# Patient Record
Sex: FEMALE | Race: WHITE | NOT HISPANIC OR LATINO | URBAN - METROPOLITAN AREA
[De-identification: names, ages, dates, MRNs, and addresses within clinical notes are randomized per-mention and may not be internally consistent; named-entity substitution may affect disease eponyms.]

---

## 2022-02-19 ENCOUNTER — EMERGENCY (EMERGENCY)
Facility: HOSPITAL | Age: 87
LOS: 1 days | Discharge: HOME | End: 2022-02-19
Attending: STUDENT IN AN ORGANIZED HEALTH CARE EDUCATION/TRAINING PROGRAM
Payer: MEDICARE

## 2022-02-19 VITALS
OXYGEN SATURATION: 98 % | DIASTOLIC BLOOD PRESSURE: 62 MMHG | HEART RATE: 75 BPM | SYSTOLIC BLOOD PRESSURE: 121 MMHG | RESPIRATION RATE: 16 BRPM

## 2022-02-19 VITALS
OXYGEN SATURATION: 92 % | SYSTOLIC BLOOD PRESSURE: 124 MMHG | RESPIRATION RATE: 18 BRPM | DIASTOLIC BLOOD PRESSURE: 60 MMHG | HEART RATE: 56 BPM

## 2022-02-19 LAB
ALBUMIN SERPL ELPH-MCNC: 4 G/DL — SIGNIFICANT CHANGE UP (ref 3.3–5)
ALP SERPL-CCNC: 71 U/L — SIGNIFICANT CHANGE UP (ref 40–120)
ALT FLD-CCNC: 14 U/L — SIGNIFICANT CHANGE UP (ref 10–45)
ANION GAP SERPL CALC-SCNC: 15 MMOL/L — SIGNIFICANT CHANGE UP (ref 5–17)
APPEARANCE UR: ABNORMAL
APTT BLD: 28.9 SEC — SIGNIFICANT CHANGE UP (ref 27.5–35.5)
AST SERPL-CCNC: 21 U/L — SIGNIFICANT CHANGE UP (ref 10–40)
BACTERIA # UR AUTO: ABNORMAL
BASE EXCESS BLDV CALC-SCNC: -0.8 MMOL/L — SIGNIFICANT CHANGE UP (ref -2–2)
BASOPHILS # BLD AUTO: 0.04 K/UL — SIGNIFICANT CHANGE UP (ref 0–0.2)
BASOPHILS NFR BLD AUTO: 0.4 % — SIGNIFICANT CHANGE UP (ref 0–2)
BILIRUB SERPL-MCNC: 0.4 MG/DL — SIGNIFICANT CHANGE UP (ref 0.2–1.2)
BILIRUB UR-MCNC: NEGATIVE — SIGNIFICANT CHANGE UP
BUN SERPL-MCNC: 42 MG/DL — HIGH (ref 7–23)
CA-I SERPL-SCNC: 1.23 MMOL/L — SIGNIFICANT CHANGE UP (ref 1.15–1.33)
CALCIUM SERPL-MCNC: 9.5 MG/DL — SIGNIFICANT CHANGE UP (ref 8.4–10.5)
CHLORIDE BLDV-SCNC: 103 MMOL/L — SIGNIFICANT CHANGE UP (ref 96–108)
CHLORIDE SERPL-SCNC: 102 MMOL/L — SIGNIFICANT CHANGE UP (ref 96–108)
CK SERPL-CCNC: 443 U/L — HIGH (ref 25–170)
CO2 BLDV-SCNC: 26 MMOL/L — SIGNIFICANT CHANGE UP (ref 22–26)
CO2 SERPL-SCNC: 22 MMOL/L — SIGNIFICANT CHANGE UP (ref 22–31)
COLOR SPEC: YELLOW — SIGNIFICANT CHANGE UP
CREAT SERPL-MCNC: 1.21 MG/DL — SIGNIFICANT CHANGE UP (ref 0.5–1.3)
DIFF PNL FLD: NEGATIVE — SIGNIFICANT CHANGE UP
EOSINOPHIL # BLD AUTO: 0.28 K/UL — SIGNIFICANT CHANGE UP (ref 0–0.5)
EOSINOPHIL NFR BLD AUTO: 2.9 % — SIGNIFICANT CHANGE UP (ref 0–6)
EPI CELLS # UR: 1 /HPF — SIGNIFICANT CHANGE UP
GAS PNL BLDV: 136 MMOL/L — SIGNIFICANT CHANGE UP (ref 136–145)
GAS PNL BLDV: SIGNIFICANT CHANGE UP
GAS PNL BLDV: SIGNIFICANT CHANGE UP
GLUCOSE BLDV-MCNC: 89 MG/DL — SIGNIFICANT CHANGE UP (ref 70–99)
GLUCOSE SERPL-MCNC: 97 MG/DL — SIGNIFICANT CHANGE UP (ref 70–99)
GLUCOSE UR QL: NEGATIVE — SIGNIFICANT CHANGE UP
HCO3 BLDV-SCNC: 24 MMOL/L — SIGNIFICANT CHANGE UP (ref 22–29)
HCT VFR BLD CALC: 31.2 % — LOW (ref 34.5–45)
HCT VFR BLDA CALC: 32 % — LOW (ref 34.5–46.5)
HGB BLD CALC-MCNC: 10.7 G/DL — LOW (ref 11.7–16.1)
HGB BLD-MCNC: 10.2 G/DL — LOW (ref 11.5–15.5)
HYALINE CASTS # UR AUTO: 4 /LPF — HIGH (ref 0–2)
IMM GRANULOCYTES NFR BLD AUTO: 1.2 % — SIGNIFICANT CHANGE UP (ref 0–1.5)
INR BLD: 1.09 RATIO — SIGNIFICANT CHANGE UP (ref 0.88–1.16)
KETONES UR-MCNC: NEGATIVE — SIGNIFICANT CHANGE UP
LACTATE BLDV-MCNC: 1.9 MMOL/L — SIGNIFICANT CHANGE UP (ref 0.7–2)
LEUKOCYTE ESTERASE UR-ACNC: ABNORMAL
LYMPHOCYTES # BLD AUTO: 1.84 K/UL — SIGNIFICANT CHANGE UP (ref 1–3.3)
LYMPHOCYTES # BLD AUTO: 19.3 % — SIGNIFICANT CHANGE UP (ref 13–44)
MCHC RBC-ENTMCNC: 30.3 PG — SIGNIFICANT CHANGE UP (ref 27–34)
MCHC RBC-ENTMCNC: 32.7 GM/DL — SIGNIFICANT CHANGE UP (ref 32–36)
MCV RBC AUTO: 92.6 FL — SIGNIFICANT CHANGE UP (ref 80–100)
MONOCYTES # BLD AUTO: 0.81 K/UL — SIGNIFICANT CHANGE UP (ref 0–0.9)
MONOCYTES NFR BLD AUTO: 8.5 % — SIGNIFICANT CHANGE UP (ref 2–14)
NEUTROPHILS # BLD AUTO: 6.47 K/UL — SIGNIFICANT CHANGE UP (ref 1.8–7.4)
NEUTROPHILS NFR BLD AUTO: 67.7 % — SIGNIFICANT CHANGE UP (ref 43–77)
NITRITE UR-MCNC: NEGATIVE — SIGNIFICANT CHANGE UP
NRBC # BLD: 0 /100 WBCS — SIGNIFICANT CHANGE UP (ref 0–0)
PCO2 BLDV: 41 MMHG — SIGNIFICANT CHANGE UP (ref 39–42)
PH BLDV: 7.38 — SIGNIFICANT CHANGE UP (ref 7.32–7.43)
PH UR: 5.5 — SIGNIFICANT CHANGE UP (ref 5–8)
PLATELET # BLD AUTO: 206 K/UL — SIGNIFICANT CHANGE UP (ref 150–400)
PO2 BLDV: 44 MMHG — SIGNIFICANT CHANGE UP (ref 25–45)
POTASSIUM BLDV-SCNC: 4.1 MMOL/L — SIGNIFICANT CHANGE UP (ref 3.5–5.1)
POTASSIUM SERPL-MCNC: 4.4 MMOL/L — SIGNIFICANT CHANGE UP (ref 3.5–5.3)
POTASSIUM SERPL-SCNC: 4.4 MMOL/L — SIGNIFICANT CHANGE UP (ref 3.5–5.3)
PROT SERPL-MCNC: 6.8 G/DL — SIGNIFICANT CHANGE UP (ref 6–8.3)
PROT UR-MCNC: SIGNIFICANT CHANGE UP
PROTHROM AB SERPL-ACNC: 13 SEC — SIGNIFICANT CHANGE UP (ref 10.6–13.6)
RBC # BLD: 3.37 M/UL — LOW (ref 3.8–5.2)
RBC # FLD: 15.7 % — HIGH (ref 10.3–14.5)
RBC CASTS # UR COMP ASSIST: 2 /HPF — SIGNIFICANT CHANGE UP (ref 0–4)
SAO2 % BLDV: 75.4 % — SIGNIFICANT CHANGE UP (ref 67–88)
SARS-COV-2 RNA SPEC QL NAA+PROBE: SIGNIFICANT CHANGE UP
SODIUM SERPL-SCNC: 139 MMOL/L — SIGNIFICANT CHANGE UP (ref 135–145)
SP GR SPEC: 1.02 — SIGNIFICANT CHANGE UP (ref 1.01–1.02)
UROBILINOGEN FLD QL: NEGATIVE — SIGNIFICANT CHANGE UP
WBC # BLD: 9.55 K/UL — SIGNIFICANT CHANGE UP (ref 3.8–10.5)
WBC # FLD AUTO: 9.55 K/UL — SIGNIFICANT CHANGE UP (ref 3.8–10.5)
WBC UR QL: 8 /HPF — HIGH (ref 0–5)

## 2022-02-19 PROCEDURE — 93010 ELECTROCARDIOGRAM REPORT: CPT

## 2022-02-19 PROCEDURE — 71045 X-RAY EXAM CHEST 1 VIEW: CPT

## 2022-02-19 PROCEDURE — 82550 ASSAY OF CK (CPK): CPT

## 2022-02-19 PROCEDURE — 84295 ASSAY OF SERUM SODIUM: CPT

## 2022-02-19 PROCEDURE — 72170 X-RAY EXAM OF PELVIS: CPT | Mod: 26

## 2022-02-19 PROCEDURE — 82435 ASSAY OF BLOOD CHLORIDE: CPT

## 2022-02-19 PROCEDURE — 85730 THROMBOPLASTIN TIME PARTIAL: CPT

## 2022-02-19 PROCEDURE — 73070 X-RAY EXAM OF ELBOW: CPT | Mod: 26,LT

## 2022-02-19 PROCEDURE — 73090 X-RAY EXAM OF FOREARM: CPT

## 2022-02-19 PROCEDURE — 82803 BLOOD GASES ANY COMBINATION: CPT

## 2022-02-19 PROCEDURE — 83605 ASSAY OF LACTIC ACID: CPT

## 2022-02-19 PROCEDURE — 96374 THER/PROPH/DIAG INJ IV PUSH: CPT

## 2022-02-19 PROCEDURE — 84132 ASSAY OF SERUM POTASSIUM: CPT

## 2022-02-19 PROCEDURE — 73070 X-RAY EXAM OF ELBOW: CPT

## 2022-02-19 PROCEDURE — 71045 X-RAY EXAM CHEST 1 VIEW: CPT | Mod: 26

## 2022-02-19 PROCEDURE — 87635 SARS-COV-2 COVID-19 AMP PRB: CPT

## 2022-02-19 PROCEDURE — 90471 IMMUNIZATION ADMIN: CPT

## 2022-02-19 PROCEDURE — 81001 URINALYSIS AUTO W/SCOPE: CPT

## 2022-02-19 PROCEDURE — 85018 HEMOGLOBIN: CPT

## 2022-02-19 PROCEDURE — 85025 COMPLETE CBC W/AUTO DIFF WBC: CPT

## 2022-02-19 PROCEDURE — 90715 TDAP VACCINE 7 YRS/> IM: CPT

## 2022-02-19 PROCEDURE — 82565 ASSAY OF CREATININE: CPT

## 2022-02-19 PROCEDURE — 99284 EMERGENCY DEPT VISIT MOD MDM: CPT

## 2022-02-19 PROCEDURE — 80053 COMPREHEN METABOLIC PANEL: CPT

## 2022-02-19 PROCEDURE — 70450 CT HEAD/BRAIN W/O DYE: CPT | Mod: 26,MB

## 2022-02-19 PROCEDURE — 82330 ASSAY OF CALCIUM: CPT

## 2022-02-19 PROCEDURE — 70450 CT HEAD/BRAIN W/O DYE: CPT | Mod: MB

## 2022-02-19 PROCEDURE — 72125 CT NECK SPINE W/O DYE: CPT | Mod: MB

## 2022-02-19 PROCEDURE — 93005 ELECTROCARDIOGRAM TRACING: CPT

## 2022-02-19 PROCEDURE — 85014 HEMATOCRIT: CPT

## 2022-02-19 PROCEDURE — 72170 X-RAY EXAM OF PELVIS: CPT

## 2022-02-19 PROCEDURE — 99285 EMERGENCY DEPT VISIT HI MDM: CPT | Mod: 25

## 2022-02-19 PROCEDURE — 82947 ASSAY GLUCOSE BLOOD QUANT: CPT

## 2022-02-19 PROCEDURE — 73090 X-RAY EXAM OF FOREARM: CPT | Mod: 26,LT

## 2022-02-19 PROCEDURE — 36415 COLL VENOUS BLD VENIPUNCTURE: CPT

## 2022-02-19 PROCEDURE — 72125 CT NECK SPINE W/O DYE: CPT | Mod: 26,MB

## 2022-02-19 PROCEDURE — 87086 URINE CULTURE/COLONY COUNT: CPT

## 2022-02-19 PROCEDURE — 85610 PROTHROMBIN TIME: CPT

## 2022-02-19 RX ORDER — ACETAMINOPHEN 500 MG
975 TABLET ORAL ONCE
Refills: 0 | Status: DISCONTINUED | OUTPATIENT
Start: 2022-02-19 | End: 2022-02-19

## 2022-02-19 RX ORDER — ACETAMINOPHEN 500 MG
1000 TABLET ORAL ONCE
Refills: 0 | Status: COMPLETED | OUTPATIENT
Start: 2022-02-19 | End: 2022-02-19

## 2022-02-19 RX ORDER — TETANUS TOXOID, REDUCED DIPHTHERIA TOXOID AND ACELLULAR PERTUSSIS VACCINE, ADSORBED 5; 2.5; 8; 8; 2.5 [IU]/.5ML; [IU]/.5ML; UG/.5ML; UG/.5ML; UG/.5ML
0.5 SUSPENSION INTRAMUSCULAR ONCE
Refills: 0 | Status: COMPLETED | OUTPATIENT
Start: 2022-02-19 | End: 2022-02-19

## 2022-02-19 RX ADMIN — TETANUS TOXOID, REDUCED DIPHTHERIA TOXOID AND ACELLULAR PERTUSSIS VACCINE, ADSORBED 0.5 MILLILITER(S): 5; 2.5; 8; 8; 2.5 SUSPENSION INTRAMUSCULAR at 02:26

## 2022-02-19 RX ADMIN — Medication 400 MILLIGRAM(S): at 02:27

## 2022-02-19 NOTE — ED PROVIDER NOTE - CLINICAL SUMMARY MEDICAL DECISION MAKING FREE TEXT BOX
89 y/o female with pmhx of RA, HTN, macular degeneration, diffuse contractures, and Alzheimer's (Non-verbal at baseline) presenting from NH for unwitnessed fall with large hematoma on left forehead, no midline spinal TTP, no pelvic laxity, not on AC, unsure if UTD with tetanus. Trauma w/u including CT head/c-spine, CXR, pelvix xray, xray of LUE and basic labs including CK with UA

## 2022-02-19 NOTE — ED PROVIDER NOTE - NSFOLLOWUPINSTRUCTIONS_ED_ALL_ED_FT
- if pt develops any symptoms in 10d - 3 wks, given her age and the mechanism of injury, pt requires another CT head scan to evaluate for subdural hemorrhage or other late onset head injury. Please give her appropriate PCP follow up.    Closed Head Injury    A closed head injury is an injury to your head that may or may not involve a traumatic brain injury (TBI). Symptoms of TBI can be short or long lasting and include headache, dizziness, interference with memory or speech, fatigue, confusion, changes in sleep, mood changes, nausea, depression/anxiety, and dulling of senses. Make sure to obtain proper rest which includes getting plenty of sleep, avoiding excessive visual stimulation, and avoiding activities that may cause physical or mental stress. Avoid any situation where there is potential for another head injury, including sports.    SEEK IMMEDIATE MEDICAL CARE IF YOU HAVE ANY OF THE FOLLOWING SYMPTOMS: unusual drowsiness, vomiting, severe dizziness, seizures, lightheadedness, muscular weakness, different pupil sizes, visual changes, or clear or bloody discharge from your ears or nose. Xray of your pelvis showed a questionable luceny in your right hip hardware but given that you are not tender in this area it is unlikely that it represents a fracture. If you develop hip pain please return to ER.     - if pt develops any symptoms in 10d - 3 wks, given her age and the mechanism of injury, pt requires another CT head scan to evaluate for subdural hemorrhage or other late onset head injury. Please give her appropriate PCP follow up.    Closed Head Injury    A closed head injury is an injury to your head that may or may not involve a traumatic brain injury (TBI). Symptoms of TBI can be short or long lasting and include headache, dizziness, interference with memory or speech, fatigue, confusion, changes in sleep, mood changes, nausea, depression/anxiety, and dulling of senses. Make sure to obtain proper rest which includes getting plenty of sleep, avoiding excessive visual stimulation, and avoiding activities that may cause physical or mental stress. Avoid any situation where there is potential for another head injury, including sports.    SEEK IMMEDIATE MEDICAL CARE IF YOU HAVE ANY OF THE FOLLOWING SYMPTOMS: unusual drowsiness, vomiting, severe dizziness, seizures, lightheadedness, muscular weakness, different pupil sizes, visual changes, or clear or bloody discharge from your ears or nose.

## 2022-02-19 NOTE — ED ADULT NURSE NOTE - NSICDXPASTSURGICALHX_GEN_ALL_CORE_FT
PAST SURGICAL HISTORY:  Dementia mild    History of hip fracture s/p hip surgery    S/P laparoscopic cholecystectomy

## 2022-02-19 NOTE — ED ADULT NURSE REASSESSMENT NOTE - NS ED NURSE REASSESS COMMENT FT1
pt resting comfortably, no complaints at this time, pending nonemergent pt resting comfortably, no complaints at this time, pending nonemergent, nonemergent confirmed with Antione at the red desk

## 2022-02-19 NOTE — ED PROVIDER NOTE - ATTENDING CONTRIBUTION TO CARE
I have personally seen and examined this patient.  I have fully participated in the care of this patient. I performed a substantive portion of the visit including all aspects of the medical decision making. I have reviewed all pertinent clinical information, including history, physical exam, plan and the Resident’s note and agree except as noted. - MD Mary.    89 yo F, from NH after fall, unwitnessed, ++ bruise and hematoma on the left frontal, moving all extremities, no pain or limited ROM, reportedly no AC or ASA on her meds list, likely dc after labs, ua, but given her age and hematoma on the frontal, pt is at risk for devloping SDH in the later period, will communicate with NH for follow up plan.

## 2022-02-19 NOTE — ED PROVIDER NOTE - PATIENT PORTAL LINK FT
You can access the FollowMyHealth Patient Portal offered by [facility] by registering at the following website: [email or web address]. By joining [employer]’s FollowMyHealth portal, you will also be able to view your health information using other applications (apps) compatible with our system.

## 2022-02-19 NOTE — ED ADULT NURSE NOTE - OBJECTIVE STATEMENT
89 y.o. female coming in from a memory floor at [facility] via EMS for a fall. EMS states that the pt was found on the ground at 2300 after an unwitnessed fall. EMS states that the nurse was doing her rounds and found her down with a large baseball sized hematoma to her left forehead with minimal bleeding from the site. pt is nonverbal but Monegasque speaking, does not follow verbal commands at baseline. PMH of dementia, HTN, arthritis. A&Ox3, vitals stable, lung sounds clear bilaterally, no visible deformities other than hematoma on left forehead, bruise noted to left forearm/elbow but as per EMS it is unknown if it was there prior to the fall, neuro exam (PERRL 3, HARDIN to stimuli, unable to follow commands due to severity of illness), pulses present throughout lower and upper extremities, no active bleeding from hematoma, skin is dry and intact, a quarter sized blanchable stage 1 on the left buttock area.

## 2022-02-19 NOTE — ED PROVIDER NOTE - OBJECTIVE STATEMENT
89 y/o female with pmhx of RA, HTN, macular degeneration, diffuse contractures, and Alzheimer's (Non-verbal at baseline) presenting from NH for unwitnessed fall with large hematoma on left forehead. Nurse at NH reports that she found patient on side of her bed with large bump on her head. Patient unab 89 y/o female with pmhx of RA, HTN, macular degeneration, diffuse contractures, and Alzheimer's (Non-verbal at baseline) presenting from NH for unwitnessed fall with large hematoma on left forehead. Nurse at NH reports that she found patient on side of her bed with large bump on her head awake. Patient unable to provide hx due to dementia/non-verbal at baseline. No known AC.

## 2022-02-19 NOTE — ED PROVIDER NOTE - PHYSICAL EXAMINATION
Gen: WDWN, NAD, diffusely contracted, non-verbal   HEENT: Large hematoma to left forehead with no overlying laceration, no holman sign, PERRLA, EOMI, no nasal discharge, mucous membranes moist, no oropharyngeal edema/erythema/exudates   CV: RRR, +S1/S2, no M/R/G, equal b/l radial pulses 2+, no chest wall TTP/bruising/crepitus   Resp: CTAB, no W/R/R, SPO2 >95% on RA, no increased WOB   GI: Abdomen soft non-distended, NTTP, no masses/organomegaly   MSK/Skin: No midline spinal TTP or step offs, no CVA tenderness. LUE bruising to lateral forearm/elbow. No pelvic laxity   Neuro: A&Ox0, moving all 4 extremities spontaneously

## 2022-02-19 NOTE — ED PROVIDER NOTE - PROGRESS NOTE DETAILS
Tito, PGY-2  CT with no acute abnormalities. X-ray pelvis with questionable luceny in right hip hardware that could represent hardware fracture; recommend to correlate clinically with point tenderness. No TTP on exam. Will d/c with return precautions Attending/MD Mary. ua with trace infectious sings in a setting of positive hyalin cast and small epihelial cell, concerning for contamination, pending ucx, follow up the culture result, dc to NH.

## 2022-02-19 NOTE — ED ADULT NURSE NOTE - NSICDXPASTMEDICALHX_GEN_ALL_CORE_FT
PAST MEDICAL HISTORY:  Age-related macular degeneration, wet, right eye     Dementia mild    Hypercholesteremia     Hypertension     Rheumatoid arthritis

## 2022-02-19 NOTE — ED PROVIDER NOTE - CARE PLAN
Principal Discharge DX:	Closed head injury   1 Principal Discharge DX:	Closed head injury  Secondary Diagnosis:	Acute UTI

## 2022-02-19 NOTE — ED POST DISCHARGE NOTE - DETAILS
2/19/22: Reviewed case with Dr. Owens, spoke with orthopedics. per ortho, images appear the same as read from films 10yrs ago. most likely over-read based on angle of hardware rather than acute fracture. Per ED documentation, no point ttp/deformity on exam. No urgent need for callback at this time - Jayesh Reed PA-C

## 2022-02-20 LAB
CULTURE RESULTS: NO GROWTH — SIGNIFICANT CHANGE UP
SPECIMEN SOURCE: SIGNIFICANT CHANGE UP
